# Patient Record
Sex: MALE | Race: OTHER
[De-identification: names, ages, dates, MRNs, and addresses within clinical notes are randomized per-mention and may not be internally consistent; named-entity substitution may affect disease eponyms.]

---

## 2019-02-03 ENCOUNTER — HOSPITAL ENCOUNTER (EMERGENCY)
Dept: HOSPITAL 56 - MW.ED | Age: 44
Discharge: HOME | End: 2019-02-03
Payer: COMMERCIAL

## 2019-02-03 DIAGNOSIS — Z88.0: ICD-10-CM

## 2019-02-03 DIAGNOSIS — F17.210: ICD-10-CM

## 2019-02-03 DIAGNOSIS — L02.416: Primary | ICD-10-CM

## 2019-02-03 PROCEDURE — 99282 EMERGENCY DEPT VISIT SF MDM: CPT

## 2019-02-03 PROCEDURE — 10060 I&D ABSCESS SIMPLE/SINGLE: CPT

## 2019-02-03 NOTE — EDM.PDOC
ED HPI GENERAL MEDICAL PROBLEM





- General


Chief Complaint: Skin Complaint


Stated Complaint: GROWTH ON THIGH


Time Seen by Provider: 02/03/19 09:00





- History of Present Illness


INITIAL COMMENTS - FREE TEXT/NARRATIVE: 





HISTORY AND PHYSICAL:





History of present illness:


The patient is a 43-year-old male with no stated medical problems who presents 

with complaints of a bump/growth to his left inner thigh that started about 7 

days ago. He is not sure how it happened but he does admit that he does shave 

the hair and that area and he thought maybe he caused by shaving. He hasn't had 

any direct trauma to that area. He says that it is gradually increased in size 

and has become significantly more painful over the last 3 days. He has not 

noticed any drainage to the area and has no testicular pain or swelling no 

troubles with urination and no systemic complaints of fever chills chest pain 

shortness of breath abdominal pain vomiting or diarrhea. The patient says he 

has not manipulated the area or squeezed it. He also states to me that he has a 

history of having other lumpy fatty areas on his upper extremities and he was 

not sure what the more. He's concerned about the pain at the inner thigh is why 

he came in here. He is able to ambulate





Review of systems: 


As per history of present illness and below otherwise all systems reviewed and 

negative.





Past medical history: 


As per history of present illness and as reviewed below otherwise 

noncontributory.





Surgical history: 


As per history of present illness and as reviewed below otherwise 

noncontributory.





Social history: 


No reported history of drug or alcohol abuse.





Family history: 


As per history of present illness and as reviewed below otherwise 

noncontributory.





Physical exam:


General: Well-developed well-nourished man who is nontoxic and vital signs were 

noted by me


HEENT: Atraumatic, normocephalic,  negative for conjunctival pallor or scleral 

icterus, mucous membranes moist, throat clear, neck supple, nontender, trachea 

midline.


Lungs: Clear to auscultation, breath sounds equal bilaterally, chest nontender.


Heart: S1S2, regular rate and rhythm no murmurs


Abdomen: Soft, nondistended, nontender. Negative for masses or 

hepatosplenomegaly. NABS


Pelvis: Stable nontender.


Genitourinary: Testicles are descended bilaterally and there is no evidence of 

any erythema or swelling of the scrotal skin and there is no erythema or 

fluctuance or crepitance of the perineal area. At the left inner thigh there is 

a 3.5 x 3.5 area of hard induration with a central raised area of fluctuance 

that is 2 x 2.5 centimeter oval area. There is ill-defined erythema in this 

area that extends beyond the induration in the fluctuance down the inner thigh 

but it does not extend up into the perineum. There is only shoddy inguinal 

adenopathy. There is tenderness in this region but the compartment is soft and 

the patient can range of motion


Rectal: Deferred.


Extremities: Atraumatic, negative for cords or calf pain. Neurovascular 

unremarkable. On the patient's upper extremities there are several fatty 

lipomas appreciated that are mobile and exist in the subcutaneous tissue and 

are not tender not erythematous


Neuro: Awake, alert, oriented. Cranial nerves II through XII unremarkable. 

Cerebellum unremarkable. Motor and sensory unremarkable throughout. Exam 

nonfocal.





Diagnostics:


None





Therapeutics:


Bactrim, lidocaine with epinephrine for incision and drainage





Procedure note: After the procedure was explained to the patient the patient 

was positioned in the area was prepped and draped and 1% lidocaine with 

epinephrine was infused. Using a #11 blade an incision was made and thin pus 

mixed with blood emerged as well as some thicker pus with foul order. There was 

no loculations when the abscess cavity was probed with a hemostat and


Iodoform gauze was packed into the wound ; the patient tolerated the procedure 

well with no complications. He is aware that he needs to return to have the 

gauze removed in 24-36 hours





Impression: 


Left inner thigh abscess





Definitive disposition and diagnosis as appropriate pending reevaluation and 

review of above.


  ** left inner thigh


Pain Score (Numeric/FACES): 7





- Related Data


 Allergies











Allergy/AdvReac Type Severity Reaction Status Date / Time


 


Penicillins Allergy Severe Hives Verified 02/03/19 09:07











Home Meds: 


 Home Meds





. [No Known Home Meds]  04/17/14 [History]











Past Medical History





- Past Health History


Medical/Surgical History: Denies Medical/Surgical History


Musculoskeletal History: Reports: Other (See Below)


Other Musculoskeletal History: ACL repair left side





- Infectious Disease History


Infectious Disease History: Reports: Chicken Pox





Social & Family History





- Family History


Family Medical History: Noncontributory





- Tobacco Use


Smoking Status *Q: Light Tobacco Smoker


Years of Tobacco use: 20


Packs/Tins Daily: 0.2





- Caffeine Use


Caffeine Use: Reports: Coffee





- Recreational Drug Use


Recreational Drug Use: No





ED ROS GENERAL





- Review of Systems


Review Of Systems: ROS reveals no pertinent complaints other than HPI.





ED EXAM, SKIN/RASH


Exam: See Below (See dictation)





Course





- Vital Signs


Last Recorded V/S: 


 Last Vital Signs











Temp  36.2 C   02/03/19 09:00


 


Pulse  72   02/03/19 09:00


 


Resp  18   02/03/19 09:00


 


BP  133/75   02/03/19 09:00


 


Pulse Ox  98   02/03/19 09:00














- Orders/Labs/Meds


Meds: 


Medications














Discontinued Medications














Generic Name Dose Route Start Last Admin





  Trade Name Gloria  PRN Reason Stop Dose Admin


 


Lidocaine/Epinephrine  20 ml  02/03/19 09:18  02/03/19 09:25





  Xylocaine 1% With Epinephrine 1:100,000  INJECT  02/03/19 09:19  20 ml





  ONETIME ONE   Administration





     





     





     





     


 


Trimethoprim/Sulfamethoxazole  1 tab  02/03/19 09:18  02/03/19 09:25





  Septra Ds  PO  02/03/19 09:19  1 tab





  ONETIME ONE   Administration





     





     





     





     














Departure





- Departure


Time of Disposition: 09:49


Disposition: Home, Self-Care 01


Condition: Good


Clinical Impression: 


 Abscess








- Discharge Information


Referrals: 


PCP,Unknown [Primary Care Provider] - 


Forms:  ED Department Discharge


Additional Instructions: 


The following information is given to patients seen in the emergency department 

who are being discharged to home. This information is to outline your options 

for follow-up care. We provide all patients seen in our emergency department 

with a follow-up referral.





The need for follow-up, as well as the timing and circumstances, are variable 

depending upon the specifics of your emergency department visit.





If you don't have a primary care physician on staff, we will provide you with a 

referral. We always advise you to contact your personal physician following an 

emergency department visit to inform them of the circumstance of the visit and 

for follow-up with them and/or the need for any referrals to a consulting 

specialist.





The emergency department will also refer you to a specialist when appropriate. 

This referral assures that you have the opportunity for followup care with a 

specialist. All of these measure are taken in an effort to provide you with 

optimal care, which includes your followup.





Under all circumstances we always encourage you to contact your private 

physician who remains a resource for coordinating  your care. When calling for 

followup care, please make the office aware that this follow-up is from your 

recent emergency room visit. If for any reason you are refused follow-up, 

please contact the [facility] emergency 

department at [phone] and ask to speak to the emergency department 

charge nurse.





[facility] 


Primary care- Internal Medicine and Family [address] [address]


[phone]








Take antibiotics as directed and use over-the-counter Tylenol or ibuprofen for 

pain and take the stronger pain medication as needed and as directed. Please 

return to ER in 24-36 hours to have iodoform pack removed and the wound 

reevaluated for repacking and further care. Please also schedule a follow-up 

appointment in our clinic for further care and evaluation. Please do not 

manipulate the area or remove the pack at home. Please take care not to pull 

the pack out accidentally.





You have been given Bactrim and Tylenol with Codeine sent to [facility]

## 2019-02-04 ENCOUNTER — HOSPITAL ENCOUNTER (EMERGENCY)
Dept: HOSPITAL 56 - MW.ED | Age: 44
Discharge: HOME | End: 2019-02-04
Payer: COMMERCIAL

## 2019-02-04 DIAGNOSIS — F17.210: ICD-10-CM

## 2019-02-04 DIAGNOSIS — L02.416: Primary | ICD-10-CM

## 2019-02-04 DIAGNOSIS — Z88.0: ICD-10-CM

## 2019-02-04 NOTE — EDM.PDOC
ED HPI GENERAL MEDICAL PROBLEM





- General


Chief Complaint: Wound Recheck


Stated Complaint: FOLLOW UP


Time Seen by Provider: 02/04/19 10:50





- History of Present Illness


INITIAL COMMENTS - FREE TEXT/NARRATIVE: 





HISTORY AND PHYSICAL:





History of present illness:


The patient is a healthy 43-year-old male who presents for recheck of an 

abscess that I drained yesterday. The patient says he is doing well and there 

has been drainage yesterday from the area but that has eased off. He is taking 

his antibiotics and feels that things are improving.





Review of systems: 


As per history of present illness and below otherwise all systems reviewed and 

negative.





Past medical history: 


As per history of present illness and as reviewed below otherwise 

noncontributory.





Surgical history: 


As per history of present illness and as reviewed below otherwise 

noncontributory.





Social history: 


No reported history of drug or alcohol abuse.





Family history: 


As per history of present illness and as reviewed below otherwise 

noncontributory.





Physical exam:


HEENT: Atraumatic, normocephalic,  negative for conjunctival pallor or scleral 

icterus, mucous membranes moist, throat clear, neck supple, nontender, trachea 

midline.


Lungs: Deferred


Cardiac deferred


Abdomen: Soft, nondistended, nontender.


Pelvis: Deferred


Genitourinary: Deferred.


Rectal: Deferred.


Extremities: Atraumatic, range of motion without defects or deficits. At the 

left inner thigh the area of erythema that was marked yesterday has not 

expanded and the packing is seen. There is minimal drainage on the dressing. 

The iodoform pack was removed by me without complication. Neurovascular 

unremarkable.


Neuro: Awake, alert, oriented. Cranial nerves II through XII unremarkable. 

Cerebellum unremarkable. Motor and sensory unremarkable throughout. Exam 

nonfocal.





Diagnostics:


[]





Therapeutics:


[]


I removed the iodoform pack without complication and explored to the base and 

cavity of the abscess. It is clean and dry without any areas of fluctuance. I 

discussed with the patient irrigating this at home and keeping the area clean 

and we will not repack it. He says he is driving home to [place] in a day or 

so and advised him to get it followed up there.


Impression: 


Abscess recheck, pack removal





Definitive disposition and diagnosis as appropriate pending reevaluation and 

review of above.





- Related Data


 Allergies











Allergy/AdvReac Type Severity Reaction Status Date / Time


 


Penicillins Allergy Severe Hives Verified 02/04/19 10:57











Home Meds: 


 Home Meds





Acetaminophen with Codeine [Tylenol with Codeine #3 Tablet] 1 each PO QID PRN #

14 tablet 02/03/19 [Rx]


Sulfamethoxazole/Trimethoprim [Bactrim Ds Tablet] 1 each PO BID #20 tablet 02/03 /19 [Rx]











Past Medical History





- Past Health History


Medical/Surgical History: Denies Medical/Surgical History


HEENT History: Reports: None


Cardiovascular History: Reports: None


Respiratory History: Reports: None


Gastrointestinal History: Reports: None


Genitourinary History: Reports: None


Musculoskeletal History: Reports: Other (See Below)


Other Musculoskeletal History: ACL repair left side


Neurological History: Reports: None


Psychiatric History: Reports: None


Endocrine/Metabolic History: Reports: None


Hematologic History: Reports: None


Immunologic History: Reports: None


Oncologic (Cancer) History: Reports: None


Dermatologic History: Reports: None





- Infectious Disease History


Infectious Disease History: Reports: Chicken Pox





- Past Surgical History


Head Surgeries/Procedures: Reports: None


HEENT Surgical History: Reports: None


Cardiovascular Surgical History: Reports: None


Respiratory Surgical History: Reports: None


GI Surgical History: Reports: None


Male  Surgical History: Reports: None


Endocrine Surgical History: Reports: None


Neurological Surgical History: Reports: None


Musculoskeletal Surgical History: Reports: None


Oncologic Surgical History: Reports: None


Dermatological Surgical History: Reports: None





Social & Family History





- Family History


Family Medical History: Noncontributory





- Tobacco Use


Smoking Status *Q: Current Every Day Smoker


Years of Tobacco use: 20


Packs/Tins Daily: 0.1





- Caffeine Use


Caffeine Use: Reports: None





- Recreational Drug Use


Recreational Drug Use: No





ED ROS GENERAL





- Review of Systems


Review Of Systems: ROS reveals no pertinent complaints other than HPI.





ED EXAM, GENERAL





- Physical Exam


Exam: See Below (See dictation)





Course





- Vital Signs


Last Recorded V/S: 





 Last Vital Signs











Temp  35.4 C   02/04/19 10:55


 


Pulse  68   02/04/19 10:55


 


Resp  18   02/04/19 10:55


 


BP  123/72   02/04/19 10:55


 


Pulse Ox  98   02/04/19 10:55














Departure





- Departure


Time of Disposition: 11:10


Disposition: Home, Self-Care 01


Condition: Good


Clinical Impression: 


 Abscess re-check








- Discharge Information


Referrals: 


PCP,Unknown [Primary Care Provider] - 


Additional Instructions: 


The following information is given to patients seen in the emergency department 

who are being discharged to home. This information is to outline your options 

for follow-up care. We provide all patients seen in our emergency department 

with a follow-up referral.





The need for follow-up, as well as the timing and circumstances, are variable 

depending upon the specifics of your emergency department visit.





If you don't have a primary care physician on staff, we will provide you with a 

referral. We always advise you to contact your personal physician following an 

emergency department visit to inform them of the circumstance of the visit and 

for follow-up with them and/or the need for any referrals to a consulting 

specialist.





The emergency department will also refer you to a specialist when appropriate. 

This referral assures that you have the opportunity for followup care with a 

specialist. All of these measure are taken in an effort to provide you with 

optimal care, which includes your followup.





Under all circumstances we always encourage you to contact your private 

physician who remains a resource for coordinating  your care. When calling for 

followup care, please make the office aware that this follow-up is from your 

recent emergency room visit. If for any reason you are refused follow-up, 

please contact the [facility] emergency 

department at [phone] and ask to speak to the emergency department 

charge nurse.





 


Primary care- Internal Medicine and Family [address] [address]


[phone]





Irrigate the wound twice a day as we discussed to keep it open and clean and 

try to keep it open to air as much as possible. There still may be some 

drainage of some make sure to place a dressing on it when you cannot be at 

home. Continue and finish the antibiotics were given yesterday and use pain 

meds as you choose. Return to ER as needed and as discussed and schedule a 

follow-up appointment with your provider back home in [place] one of ours in 

the clinic in the next few days for reevaluation.